# Patient Record
Sex: FEMALE | Race: WHITE | URBAN - METROPOLITAN AREA
[De-identification: names, ages, dates, MRNs, and addresses within clinical notes are randomized per-mention and may not be internally consistent; named-entity substitution may affect disease eponyms.]

---

## 2017-01-01 ENCOUNTER — OFFICE VISIT (OUTPATIENT)
Dept: UROLOGY | Facility: CLINIC | Age: 82
End: 2017-01-01
Payer: COMMERCIAL

## 2017-01-01 ENCOUNTER — OFFICE VISIT (OUTPATIENT)
Dept: UROLOGY | Facility: OTHER | Age: 82
End: 2017-01-01
Payer: COMMERCIAL

## 2017-01-01 ENCOUNTER — TELEPHONE (OUTPATIENT)
Dept: UROLOGY | Facility: CLINIC | Age: 82
End: 2017-01-01

## 2017-01-01 VITALS — RESPIRATION RATE: 10 BRPM | DIASTOLIC BLOOD PRESSURE: 73 MMHG | HEART RATE: 64 BPM | SYSTOLIC BLOOD PRESSURE: 114 MMHG

## 2017-01-01 VITALS — HEART RATE: 66 BPM | RESPIRATION RATE: 16 BRPM | SYSTOLIC BLOOD PRESSURE: 108 MMHG | DIASTOLIC BLOOD PRESSURE: 62 MMHG

## 2017-01-01 VITALS — RESPIRATION RATE: 16 BRPM | HEART RATE: 68 BPM | SYSTOLIC BLOOD PRESSURE: 110 MMHG | DIASTOLIC BLOOD PRESSURE: 58 MMHG

## 2017-01-01 VITALS — DIASTOLIC BLOOD PRESSURE: 64 MMHG | HEART RATE: 64 BPM | RESPIRATION RATE: 18 BRPM | SYSTOLIC BLOOD PRESSURE: 112 MMHG

## 2017-01-01 VITALS — SYSTOLIC BLOOD PRESSURE: 116 MMHG | HEART RATE: 64 BPM | DIASTOLIC BLOOD PRESSURE: 82 MMHG | RESPIRATION RATE: 14 BRPM

## 2017-01-01 VITALS — RESPIRATION RATE: 12 BRPM | SYSTOLIC BLOOD PRESSURE: 122 MMHG | DIASTOLIC BLOOD PRESSURE: 78 MMHG | HEART RATE: 80 BPM

## 2017-01-01 DIAGNOSIS — R33.9 INCOMPLETE BLADDER EMPTYING: ICD-10-CM

## 2017-01-01 DIAGNOSIS — R35.0 URINARY FREQUENCY: Primary | ICD-10-CM

## 2017-01-01 DIAGNOSIS — R35.0 URINARY FREQUENCY: ICD-10-CM

## 2017-01-01 DIAGNOSIS — R35.0 FREQUENCY OF URINATION: Primary | ICD-10-CM

## 2017-01-01 DIAGNOSIS — N31.2 ATONIC BLADDER: ICD-10-CM

## 2017-01-01 DIAGNOSIS — R30.0 DYSURIA: Primary | ICD-10-CM

## 2017-01-01 DIAGNOSIS — R33.9 URINARY RETENTION: ICD-10-CM

## 2017-01-01 LAB
ALBUMIN UR-MCNC: 100 MG/DL
AMORPH CRY #/AREA URNS HPF: ABNORMAL /HPF
AMORPH CRY #/AREA URNS HPF: ABNORMAL /HPF
APPEARANCE UR: ABNORMAL
APPEARANCE UR: CLEAR
APPEARANCE UR: CLEAR
BACTERIA #/AREA URNS HPF: ABNORMAL /HPF
BACTERIA SPEC CULT: ABNORMAL
BACTERIA SPEC CULT: ABNORMAL
BILIRUB UR QL STRIP: NEGATIVE
COLOR UR AUTO: YELLOW
GLUCOSE UR STRIP-MCNC: NEGATIVE MG/DL
HGB UR QL STRIP: ABNORMAL
KETONES UR STRIP-MCNC: NEGATIVE MG/DL
LEUKOCYTE ESTERASE UR QL STRIP: ABNORMAL
MICRO REPORT STATUS: ABNORMAL
MICRO REPORT STATUS: ABNORMAL
MICROORGANISM SPEC CULT: ABNORMAL
MICROORGANISM SPEC CULT: ABNORMAL
NITRATE UR QL: NEGATIVE
NITRATE UR QL: POSITIVE
NON-SQ EPI CELLS #/AREA URNS LPF: ABNORMAL /LPF
PH UR STRIP: 6 PH (ref 5–7)
PH UR STRIP: 6.5 PH (ref 5–7)
PH UR STRIP: 7 PH (ref 5–7)
RBC #/AREA URNS AUTO: ABNORMAL /HPF (ref 0–2)
SP GR UR STRIP: 1.01 (ref 1–1.03)
SP GR UR STRIP: 1.02 (ref 1–1.03)
SPECIMEN SOURCE: ABNORMAL
SPECIMEN SOURCE: ABNORMAL
URN SPEC COLLECT METH UR: ABNORMAL
UROBILINOGEN UR STRIP-ACNC: 0.2 EU/DL (ref 0.2–1)
WBC #/AREA URNS AUTO: ABNORMAL /HPF (ref 0–2)

## 2017-01-01 PROCEDURE — 51798 US URINE CAPACITY MEASURE: CPT | Performed by: UROLOGY

## 2017-01-01 PROCEDURE — 99214 OFFICE O/P EST MOD 30 MIN: CPT | Mod: 25 | Performed by: UROLOGY

## 2017-01-01 PROCEDURE — 99213 OFFICE O/P EST LOW 20 MIN: CPT | Mod: 25 | Performed by: UROLOGY

## 2017-01-01 PROCEDURE — 87186 SC STD MICRODIL/AGAR DIL: CPT | Performed by: UROLOGY

## 2017-01-01 PROCEDURE — 99024 POSTOP FOLLOW-UP VISIT: CPT | Performed by: UROLOGY

## 2017-01-01 PROCEDURE — 64561 IMPLANT NEUROELECTRODES: CPT | Mod: 50 | Performed by: UROLOGY

## 2017-01-01 PROCEDURE — 81001 URINALYSIS AUTO W/SCOPE: CPT | Performed by: UROLOGY

## 2017-01-01 PROCEDURE — 87086 URINE CULTURE/COLONY COUNT: CPT | Performed by: UROLOGY

## 2017-01-01 PROCEDURE — 87088 URINE BACTERIA CULTURE: CPT | Performed by: UROLOGY

## 2017-01-01 RX ORDER — OXYBUTYNIN CHLORIDE 5 MG/1
5 TABLET ORAL AT BEDTIME
Qty: 30 TABLET | Refills: 1 | Status: SHIPPED | OUTPATIENT
Start: 2017-01-01 | End: 2017-01-01

## 2017-01-01 RX ORDER — MIRABEGRON 25 MG/1
25 TABLET, FILM COATED, EXTENDED RELEASE ORAL DAILY
Qty: 30 TABLET | Refills: 1 | Status: SHIPPED | OUTPATIENT
Start: 2017-01-01

## 2017-01-01 RX ORDER — METHENAMINE HIPPURATE 1000 MG/1
1 TABLET ORAL 2 TIMES DAILY
Qty: 180 TABLET | Refills: 3 | Status: SHIPPED | OUTPATIENT
Start: 2017-01-01

## 2017-01-01 RX ORDER — CIPROFLOXACIN 500 MG/1
500 TABLET, FILM COATED ORAL 2 TIMES DAILY
Qty: 10 TABLET | Refills: 0 | Status: SHIPPED | OUTPATIENT
Start: 2017-01-01 | End: 2017-01-01

## 2017-01-01 RX ORDER — CEPHALEXIN 500 MG/1
500 CAPSULE ORAL 3 TIMES DAILY
Qty: 9 CAPSULE | Refills: 0 | Status: SHIPPED | OUTPATIENT
Start: 2017-01-01 | End: 2017-01-01

## 2017-01-01 ASSESSMENT — PAIN SCALES - GENERAL: PAINLEVEL: NO PAIN (0)

## 2017-01-20 NOTE — MR AVS SNAPSHOT
"              After Visit Summary   2017    Lisa Carrillo    MRN: [id number]           Patient Information     Date Of Birth          1930        Visit Information        Provider Department      2017 9:45 AM Hernán Mijares MD [facility]        Today's Diagnoses     Dysuria    -  1     Incomplete bladder emptying            Follow-ups after your visit        Who to contact     If you have questions or need follow up information about today's clinic visit or your schedule please contact [facility] directly at [phone].  Normal or non-critical lab and imaging results will be communicated to you by [employer], letter or phone within 4 business days after the clinic has received the results. If you do not hear from us within 7 days, please contact the clinic through [employer] or phone. If you have a critical or abnormal lab result, we will notify you by phone as soon as possible.  Submit refill requests through [employer] or call your pharmacy and they will forward the refill request to us. Please allow 3 business days for your refill to be completed.          Additional Information About Your Visit        MyChart Information     [employer] lets you send messages to your doctor, view your test results, renew your prescriptions, schedule appointments and more. To sign up, go to [email or web address] . Click on \"Log in\" on the left side of the screen, which will take you to the Welcome page. Then click on \"Sign up Now\" on the right side of the page.     You will be asked to enter the access code listed below, as well as some personal information. Please follow the directions to create your username and password.     Your access code is: [email or web address]  Expires: 2017 10:43 AM     Your access code will  in 90 days. If you need help or a new code, please call your [facility] or [phone].        Care EveryWhere ID     This is your Care EveryWhere ID. This could be " used by other organizations to access your [place] medical records  [id number]        Your Vitals Were     Pulse Respirations Last Period             80 12 (LMP Unknown)          Blood Pressure from Last 3 Encounters:   01/20/17 122/78   11/03/16 110/76   10/26/16 128/72    Weight from Last 3 Encounters:   No data found for Wt              We Performed the Following     Residual Urine By Bladder Scan     UA reflex to Microscopic and Culture     Urine Culture Aerobic Bacterial     Urine Microscopic          Today's Medication Changes          These changes are accurate as of: 1/20/17 10:43 AM.  If you have any questions, ask your nurse or doctor.               Start taking these medicines.        Dose/Directions    ciprofloxacin 500 MG tablet   Commonly known as:  CIPRO   Used for:  Dysuria   Started by:  Hernán Mijares MD        Dose:  500 mg   Take 1 tablet (500 mg) by mouth 2 times daily for 5 days   Quantity:  10 tablet   Refills:  0            Where to get your medicines      These medications were sent to [id number] - [place], [place] - [address]  [address] [address]     Phone:  [phone]    - ciprofloxacin 500 MG tablet             Primary Care Provider Office Phone #    [facility] [phone]       No address on file        Thank you!     Thank you for choosing [facility]  for your care. Our goal is always to provide you with excellent care. Hearing back from our patients is one way we can continue to improve our services. Please take a few minutes to complete the written survey that you may receive in the mail after your visit with us. Thank you!             Your Updated Medication List - Protect others around you: Learn how to safely use, store and throw away your medicines at www.disposemymeds.org.          This list is accurate as of: 1/20/17 10:43 AM.  Always use your most recent med list.                   Brand Name  Dispense Instructions for use    ascorbic acid 250 MG Chew chewable tablet    vitamin C     Take 500 mg by mouth daily       ATORVASTATIN CALCIUM PO      Patient does not know dose that she is taking       ciprofloxacin 500 MG tablet    CIPRO    10 tablet    Take 1 tablet (500 mg) by mouth 2 times daily for 5 days       CLOPIDOGREL BISULFATE PO      Patient does not know current dose that she is taking       methenamine hippurate 1 G Tabs tablet    HIPREX     Take 1 g by mouth 2 times daily       Multi-vitamin Tabs tablet      Take 1 tablet by mouth daily       traMADol 50 MG tablet    ULTRAM    20 tablet    Take 1-2 tablets ( mg) by mouth every 6 hours as needed for moderate pain

## 2017-01-20 NOTE — PROGRESS NOTES
SUBJECTIVE:  Jose Alfredo Oconnor is a pleasant 86-year-old  female who presents to clinic today for a consultation with regard to the patient's urinary frequency, dysuria with history of incomplete bladder emptying.  The patient was seen by Dr. West last year and was found to be in partial urinary retention.  She also was found to have cystitis and had a biopsy done by Dr. West that showed chronic cystitis.  She has been on methenamine for prevention of UTI.  She does perform self-intermittent catheterization about once at night; however, lately she said that she hardly had any urine output when she catheterizes herself.  She complains of some frequency, urgency and some incontinence.  She has no fever, nausea or vomiting.      Bladder scan today showed residual urine of 102 mL.  UA shows positive nitrite and leukocyte esterase along with 10-25 red blood cell per high-power field and 5-10 WBC per high-power field.      ASSESSMENT:   1.  History of incomplete bladder emptying.  Bladder scan today showed only 100 mL of water and she reports little urine output when she catheterizes herself.  I recommend that she stop performing intermittent catheterization.   2.  Dysuria with frequency, probably secondary to cystitis.      RECOMMENDATION:  Start patient on Cipro 500 mg p.o. b.i.d. for 5 days for urine culture.  If her symptoms have not improved in a couple of weeks, then it may be reasonable to go ahead and start patient on anticholinergic to help with overactive bladder symptoms.         JOSE D IYER MD             D: 2017 10:43   T: 2017 10:58   MT: LEONOR      Name:     JOSE ALFREDO OCONNOR   MRN:      [id number]        Account:      [id number]   :      1930           Visit Date:   2017      Document: [id number]

## 2017-01-20 NOTE — PROGRESS NOTES
S: See dictated note   Current Outpatient Prescriptions   Medication Sig Dispense Refill     ciprofloxacin (CIPRO) 500 MG tablet Take 1 tablet (500 mg) by mouth 2 times daily for 5 days 10 tablet 0     traMADol (ULTRAM) 50 MG tablet Take 1-2 tablets ( mg) by mouth every 6 hours as needed for moderate pain 20 tablet 0     methenamine hippurate (HIPREX) 1 G TABS Take 1 g by mouth 2 times daily       CLOPIDOGREL BISULFATE PO Patient does not know current dose that she is taking       ATORVASTATIN CALCIUM PO Patient does not know dose that she is taking       ascorbic acid (VITAMIN C) 250 MG CHEW Take 500 mg by mouth daily       multivitamin, therapeutic with minerals (MULTI-VITAMIN) TABS Take 1 tablet by mouth daily       Allergies   Allergen Reactions     Sulfa Drugs Unknown     Does not remember reaction type       No past medical history on file.  No past surgical history on file.   No family history on file.  Social History     Social History     Marital Status:      Spouse Name: N/A     Number of Children: N/A     Years of Education: N/A     Social History Main Topics     Smoking status: Never Smoker      Smokeless tobacco: None     Alcohol Use: None     Drug Use: None     Sexual Activity: Not Asked     Other Topics Concern     None     Social History Narrative       REVIEW OF SYSTEMS  =================  C: NEGATIVE for fever, chills, change in weight  I: NEGATIVE for worrisome rashes, moles or lesions  E/M: NEGATIVE for ear, mouth and throat problems  R: NEGATIVE for significant cough or SHORTNESS OF BREATH  CV:  NEGATIVE for chest pain, palpitations or peripheral edema  GI: NEGATIVE for nausea, abdominal pain, heartburn, or change in bowel habits  NEURO: NEGATIVE numbness/weakness  : see HPI  PSYCH: NEGATIVE depression/anxiety  LYmph: no new enlarged lymph nodes  Ortho: no new trauma/movements      Physical Exam:  /78 mmHg  Pulse 80  Resp 12  LMP  (LMP Unknown)   Patient is pleasant, in  no acute distress, good general condition.  HEENT:  Normalcephalic, atraumatic  Lung: no evidence of respiratory distress    Abdomen: Soft, nondistended, non tender. No masses. No rebound or guarding.   Exam: no suprapubic tenderness.  Bladder scan 102 ml.  Skin: Warm and dry.  No redness.  Neuro: grossly normal  Psych normal mood and affect  Musculoskeletal  moving all extremities    Assessment/Plan:   See dictated note

## 2017-01-20 NOTE — NURSING NOTE
Chief Complaint   Patient presents with     Consult       Initial /78 mmHg  Pulse 80  Resp 12  LMP  (LMP Unknown) There is no height or weight on file to calculate BMI.  BP completed using cuff size: jorge a Brennan RN

## 2017-02-21 NOTE — PROGRESS NOTES
Chief Complaint   Patient presents with     RECHECK       Lisa Carrillo is a 86 year old female who presents today for follow up of   Chief Complaint   Patient presents with     RECHECK    f/u for urinary issues.  She complaints mainly of nocturia.  She gets up 4-5 x at night.  During the daytime, it is not bad.  She has h/o incomplete bladder emptying and was doing SIC for a short time.  She denies any UTI symptoms    Current Outpatient Prescriptions   Medication Sig Dispense Refill     oxybutynin (DITROPAN) 5 MG tablet Take 1 tablet (5 mg) by mouth At Bedtime 30 tablet 1     traMADol (ULTRAM) 50 MG tablet Take 1-2 tablets ( mg) by mouth every 6 hours as needed for moderate pain 20 tablet 0     methenamine hippurate (HIPREX) 1 G TABS Take 1 g by mouth 2 times daily Reported on 2/21/2017       CLOPIDOGREL BISULFATE PO Patient does not know current dose that she is taking       ATORVASTATIN CALCIUM PO Patient does not know dose that she is taking       ascorbic acid (VITAMIN C) 250 MG CHEW Take 500 mg by mouth daily       multivitamin, therapeutic with minerals (MULTI-VITAMIN) TABS Take 1 tablet by mouth daily       Allergies   Allergen Reactions     Sulfa Drugs Unknown     Does not remember reaction type        No past medical history on file.  No past surgical history on file.  No family history on file.  Social History     Social History     Marital status:      Spouse name: N/A     Number of children: N/A     Years of education: N/A     Social History Main Topics     Smoking status: Never Smoker     Smokeless tobacco: None     Alcohol use None     Drug use: None     Sexual activity: Not Asked     Other Topics Concern     None     Social History Narrative       REVIEW OF SYSTEMS  =================  C: NEGATIVE for fever, chills, change in weight  I: NEGATIVE for worrisome rashes, moles or lesions  E/M: NEGATIVE for ear, mouth and throat problems  R: NEGATIVE for significant cough or SHORTNESS OF  BREATH,   CV: NEGATIVE for chest pain, palpitations or peripheral edema  GI: NEGATIVE for nausea, abdominal pain, heartburn, or change in bowel habits  NEURO: NEGATIVE any motor/sensory changes  PSYCH: NEGATIVE for recent mood disorder    Physical Exam:  /82 (BP Location: Right arm, Cuff Size: Adult Regular)  Pulse 64  Resp 14  LMP  (LMP Unknown)   Patient is pleasant, in no acute distress, good general condition.  Lung: no evidence of respiratory distress    Abdomen: Soft, nondistended, non tender. No masses. No rebound or guarding.   Exam: bladder scan 156 ml.  No cva tenderness  Skin: Warm and dry.  No redness.  Psych: normal mood and affect  Neuro: alert and oriented    Assessment/Plan:   (R35.0) Frequency of urination  (primary encounter diagnosis)  Comment:    Plan: trial of ditropan           Side effects discussed            See in one month           Briefly discussed interstim    (R33.9) Incomplete bladder emptying  Comment: bladder scan 156 ml, stable  Plan: prn.  Would not recommend SIC given low RU.

## 2017-02-21 NOTE — MR AVS SNAPSHOT
"              After Visit Summary   2017    Lisa Carrillo    MRN: [id number]           Patient Information     Date Of Birth          1930        Visit Information        Provider Department      2017 2:15 PM Hernán Mijares MD [facility]        Today's Diagnoses     Frequency of urination    -  1    Incomplete bladder emptying           Follow-ups after your visit        Who to contact     If you have questions or need follow up information about today's clinic visit or your schedule please contact [facility] directly at [phone].  Normal or non-critical lab and imaging results will be communicated to you by [employer], letter or phone within 4 business days after the clinic has received the results. If you do not hear from us within 7 days, please contact the clinic through [employer] or phone. If you have a critical or abnormal lab result, we will notify you by phone as soon as possible.  Submit refill requests through [employer] or call your pharmacy and they will forward the refill request to us. Please allow 3 business days for your refill to be completed.          Additional Information About Your Visit        MyChart Information     [employer] lets you send messages to your doctor, view your test results, renew your prescriptions, schedule appointments and more. To sign up, go to [email or web address] . Click on \"Log in\" on the left side of the screen, which will take you to the Welcome page. Then click on \"Sign up Now\" on the right side of the page.     You will be asked to enter the access code listed below, as well as some personal information. Please follow the directions to create your username and password.     Your access code is: [email or web address]  Expires: 2017 10:43 AM     Your access code will  in 90 days. If you need help or a new code, please call your [facility] or [phone].        Care EveryWhere ID     This is your Care EveryWhere ID. " This could be used by other organizations to access your [place] medical records  [id number]        Your Vitals Were     Pulse Respirations Last Period             64 14 (LMP Unknown)          Blood Pressure from Last 3 Encounters:   02/21/17 116/82   01/20/17 122/78   11/03/16 110/76    Weight from Last 3 Encounters:   No data found for Wt              We Performed the Following     MEASURE POST-VOID RESIDUAL URINE/BLADDER CAPACITY, US NON-IMAGING     UA reflex to Microscopic and Culture     Urine Microscopic          Today's Medication Changes          These changes are accurate as of: 2/21/17  2:37 PM.  If you have any questions, ask your nurse or doctor.               Start taking these medicines.        Dose/Directions    oxybutynin 5 MG tablet   Commonly known as:  DITROPAN   Used for:  Frequency of urination   Started by:  Hernán Mijares MD        Dose:  5 mg   Take 1 tablet (5 mg) by mouth At Bedtime   Quantity:  30 tablet   Refills:  1            Where to get your medicines      These medications were sent to [id number] - [place], [place] - [address]  [address] [address]     Phone:  [phone]     oxybutynin 5 MG tablet                Primary Care Provider Office Phone #    [facility] [phone]       No address on file        Thank you!     Thank you for choosing [facility]  for your care. Our goal is always to provide you with excellent care. Hearing back from our patients is one way we can continue to improve our services. Please take a few minutes to complete the written survey that you may receive in the mail after your visit with us. Thank you!             Your Updated Medication List - Protect others around you: Learn how to safely use, store and throw away your medicines at www.disposemymeds.org.          This list is accurate as of: 2/21/17  2:37 PM.  Always use your most recent med list.                   Brand Name  Dispense Instructions for use    ascorbic acid 250 MG Chew chewable tablet    vitamin C     Take 500 mg by mouth daily       ATORVASTATIN CALCIUM PO      Patient does not know dose that she is taking       CLOPIDOGREL BISULFATE PO      Patient does not know current dose that she is taking       methenamine hippurate 1 G Tabs tablet    HIPREX     Take 1 g by mouth 2 times daily Reported on 2/21/2017       Multi-vitamin Tabs tablet      Take 1 tablet by mouth daily       oxybutynin 5 MG tablet    DITROPAN    30 tablet    Take 1 tablet (5 mg) by mouth At Bedtime       traMADol 50 MG tablet    ULTRAM    20 tablet    Take 1-2 tablets ( mg) by mouth every 6 hours as needed for moderate pain

## 2017-02-21 NOTE — NURSING NOTE
Chief Complaint   Patient presents with     RECHECK       Initial /82 (BP Location: Right arm, Cuff Size: Adult Regular)  Pulse 64  Resp 14  LMP  (LMP Unknown) There is no height or weight on file to calculate BMI.  Medication Reconciliation: complete   Francine Rucker, CMA

## 2017-04-25 NOTE — PROGRESS NOTES
Bladder Scan performed. 447 ml maximum residual urine detected after 3 scans. MD informed.    Freya Do, CMA

## 2017-04-25 NOTE — MR AVS SNAPSHOT
"              After Visit Summary   2017    Lisa Carrillo    MRN: [id number]           Patient Information     Date Of Birth          1930        Visit Information        Provider Department      2017 1:45 PM Hernán Mijares MD [facility]        Today's Diagnoses     Urinary frequency    -  1    Incomplete bladder emptying           Follow-ups after your visit        Who to contact     If you have questions or need follow up information about today's clinic visit or your schedule please contact [facility] directly at [phone].  Normal or non-critical lab and imaging results will be communicated to you by [employer], letter or phone within 4 business days after the clinic has received the results. If you do not hear from us within 7 days, please contact the clinic through [employer] or phone. If you have a critical or abnormal lab result, we will notify you by phone as soon as possible.  Submit refill requests through [employer] or call your pharmacy and they will forward the refill request to us. Please allow 3 business days for your refill to be completed.          Additional Information About Your Visit        MyChart Information     [employer] lets you send messages to your doctor, view your test results, renew your prescriptions, schedule appointments and more. To sign up, go to [email or web address] . Click on \"Log in\" on the left side of the screen, which will take you to the Welcome page. Then click on \"Sign up Now\" on the right side of the page.     You will be asked to enter the access code listed below, as well as some personal information. Please follow the directions to create your username and password.     Your access code is: [email or web address]  Expires: 2017  2:24 PM     Your access code will  in 90 days. If you need help or a new code, please call your [facility] or [phone].        Care EveryWhere ID     This is your Care EveryWhere ID. This " could be used by other organizations to access your [place] medical records  [id number]        Your Vitals Were     Pulse Respirations Last Period             68 16 (LMP Unknown)          Blood Pressure from Last 3 Encounters:   04/25/17 110/58   02/21/17 116/82   01/20/17 122/78    Weight from Last 3 Encounters:   No data found for Wt              We Performed the Following     MEASURE POST-VOID RESIDUAL URINE/BLADDER CAPACITY, US NON-IMAGING     UA reflex to Microscopic and Culture     Urine Microscopic        Primary Care Provider Office Phone #    [facility] [phone]       No address on file        Thank you!     Thank you for choosing [facility]  for your care. Our goal is always to provide you with excellent care. Hearing back from our patients is one way we can continue to improve our services. Please take a few minutes to complete the written survey that you may receive in the mail after your visit with us. Thank you!             Your Updated Medication List - Protect others around you: Learn how to safely use, store and throw away your medicines at www.disposemymeds.org.          This list is accurate as of: 4/25/17  4:30 PM.  Always use your most recent med list.                   Brand Name Dispense Instructions for use    ascorbic acid 250 MG Chew chewable tablet    vitamin C     Take 500 mg by mouth daily       ATORVASTATIN CALCIUM PO      Patient does not know dose that she is taking       CLOPIDOGREL BISULFATE PO      Patient does not know current dose that she is taking       methenamine hippurate 1 G Tabs tablet    HIPREX     Take 1 g by mouth 2 times daily Reported on 2/21/2017       Multi-vitamin Tabs tablet      Take 1 tablet by mouth daily       oxybutynin 5 MG tablet    DITROPAN    30 tablet    Take 1 tablet (5 mg) by mouth At Bedtime       traMADol 50 MG tablet    ULTRAM    20 tablet    Take 1-2 tablets ( mg) by mouth every 6 hours as needed for moderate  pain

## 2017-04-25 NOTE — NURSING NOTE
Chief Complaint   Patient presents with     RECHECK     Frequency. Patient reports frequency has worsened since last appointment       Initial /58 (BP Location: Right arm, Patient Position: Chair, Cuff Size: Adult Regular)  Pulse 68  Resp 16  LMP  (LMP Unknown) There is no height or weight on file to calculate BMI.  Medication Reconciliation: complete     Freya Do CMA'

## 2017-04-25 NOTE — PROGRESS NOTES
Chief Complaint   Patient presents with     RECHECK     Frequency. Patient reports frequency has worsened since last appointment       Lisa Carrillo is a 86 year old female who presents today for follow up of   Chief Complaint   Patient presents with     RECHECK     Frequency. Patient reports frequency has worsened since last appointment    f/u for urinary frequency.  She has more symptoms lately.  She has no f/v/n.  She has tried ditropan without any improvements.    Current Outpatient Prescriptions   Medication Sig Dispense Refill     oxybutynin (DITROPAN) 5 MG tablet Take 1 tablet (5 mg) by mouth At Bedtime 30 tablet 1     traMADol (ULTRAM) 50 MG tablet Take 1-2 tablets ( mg) by mouth every 6 hours as needed for moderate pain 20 tablet 0     methenamine hippurate (HIPREX) 1 G TABS Take 1 g by mouth 2 times daily Reported on 2/21/2017       CLOPIDOGREL BISULFATE PO Patient does not know current dose that she is taking       ATORVASTATIN CALCIUM PO Patient does not know dose that she is taking       ascorbic acid (VITAMIN C) 250 MG CHEW Take 500 mg by mouth daily       multivitamin, therapeutic with minerals (MULTI-VITAMIN) TABS Take 1 tablet by mouth daily       Allergies   Allergen Reactions     Sulfa Drugs Unknown     Does not remember reaction type        No past medical history on file.  No past surgical history on file.  No family history on file.  Social History     Social History     Marital status:      Spouse name: N/A     Number of children: N/A     Years of education: N/A     Social History Main Topics     Smoking status: Never Smoker     Smokeless tobacco: None     Alcohol use None     Drug use: None     Sexual activity: Not Asked     Other Topics Concern     None     Social History Narrative       REVIEW OF SYSTEMS  =================  C: NEGATIVE for fever, chills, change in weight  I: NEGATIVE for worrisome rashes, moles or lesions  E/M: NEGATIVE for ear, mouth and throat problems  R:  NEGATIVE for significant cough or SHORTNESS OF BREATH,   CV: NEGATIVE for chest pain, palpitations or peripheral edema  GI: NEGATIVE for nausea, abdominal pain, heartburn, or change in bowel habits  NEURO: NEGATIVE any motor/sensory changes  PSYCH: NEGATIVE for recent mood disorder    Physical Exam:  /58 (BP Location: Right arm, Patient Position: Chair, Cuff Size: Adult Regular)  Pulse 68  Resp 16  LMP  (LMP Unknown)   Patient is pleasant, in no acute distress, good general condition.  Lung: no evidence of respiratory distress    Abdomen: Soft, nondistended, non tender. No masses. No rebound or guarding.   Exam: bladder scan 447 ml.  No cva tenderness  Skin: Warm and dry.  No redness.  Psych: normal mood and affect  Neuro: alert and oriented    Assessment/Plan:   (R35.0) Urinary frequency  (primary encounter diagnosis)  Comment:      (R33.9) Incomplete bladder emptying  Comment: discussed interstim as an option  Plan: info on tx given          Will schedule for testings.

## 2017-05-10 NOTE — PROGRESS NOTES
Patient returns for office interstim testings.  Patient is placed prone.  She was draped and prepped in sterile fashion.  Anatomical landmarks are outlined.  Local anesthetics injected (1% lidocaine.)  Needle is placed in S3 foramen on both sides and exchanged with temporary leads.  Patient has good  sensory functions.   She tolerated procedure well.

## 2017-05-10 NOTE — NURSING NOTE
Chief Complaint   Patient presents with     RECHECK     Interstim procedure       Initial /64 (BP Location: Right arm, Patient Position: Chair, Cuff Size: Adult Large)  Pulse 64  Resp 18  LMP  (LMP Unknown) There is no height or weight on file to calculate BMI.  Medication Reconciliation: complete     Malathi Ngo CMA (AAMA)

## 2017-05-10 NOTE — MR AVS SNAPSHOT
"              After Visit Summary   5/10/2017    Lisa Carrillo    MRN: [id number]           Patient Information     Date Of Birth          12/8/1930        Visit Information        Provider Department      5/10/2017 11:00 AM Hernán Mijares MD [facility]        Today's Diagnoses     Urinary frequency    -  1    Incomplete bladder emptying           Follow-ups after your visit        Your next 10 appointments already scheduled     May 19, 2017  9:30 AM CDT   Return Visit with Hernán Mijares MD   [facility] ([address]  [place] [id number] [phone]              Who to contact     If you have questions or need follow up information about today's clinic visit or your schedule please contact [facility] directly at [phone].  Normal or non-critical lab and imaging results will be communicated to you by MyChart, letter or phone within 4 business days after the clinic has received the results. If you do not hear from us within 7 days, please contact the clinic through MyChart or phone. If you have a critical or abnormal lab result, we will notify you by phone as soon as possible.  Submit refill requests through TouchOfModern or call your pharmacy and they will forward the refill request to us. Please allow 3 business days for your refill to be completed.          Additional Information About Your Visit        MyChart Information     TouchOfModern lets you send messages to your doctor, view your test results, renew your prescriptions, schedule appointments and more. To sign up, go to [email or web address] . Click on \"Log in\" on the left side of the screen, which will take you to the Welcome page. Then click on \"Sign up Now\" on the right side of the page.     You will be asked to enter the access code listed below, as well as some personal information. Please follow the directions to create your username and password.     Your " access code is: [email or web address]  Expires: 2017  2:24 PM     Your access code will  in 90 days. If you need help or a new code, please call your [facility] or [phone].        Care EveryWhere ID     This is your Care EveryWhere ID. This could be used by other organizations to access your [place] medical records  [id number]        Your Vitals Were     Pulse Respirations Last Period             64 18 (LMP Unknown)          Blood Pressure from Last 3 Encounters:   05/10/17 112/64   17 110/58   17 116/82    Weight from Last 3 Encounters:   No data found for Wt              We Performed the Following     PERCUT IMPLANT,NEUROELEC,SACRAL NERVE     PERCUT IMPLANT,NEUROELEC,SACRAL NERVE        Primary Care Provider Office Phone #    [facility] [phone]       No address on file        Thank you!     Thank you for choosing [facility]  for your care. Our goal is always to provide you with excellent care. Hearing back from our patients is one way we can continue to improve our services. Please take a few minutes to complete the written survey that you may receive in the mail after your visit with us. Thank you!             Your Updated Medication List - Protect others around you: Learn how to safely use, store and throw away your medicines at www.disposemymeds.org.          This list is accurate as of: 5/10/17 12:13 PM.  Always use your most recent med list.                   Brand Name Dispense Instructions for use    ascorbic acid 250 MG Chew chewable tablet    vitamin C     Take 500 mg by mouth daily       ATORVASTATIN CALCIUM PO      Patient does not know dose that she is taking       CLOPIDOGREL BISULFATE PO      Patient does not know current dose that she is taking       methenamine hippurate 1 G Tabs tablet    HIPREX     Take 1 g by mouth 2 times daily Reported on 2017       Multi-vitamin Tabs tablet      Take 1 tablet by mouth daily       oxybutynin 5 MG  tablet    DITROPAN    30 tablet    Take 1 tablet (5 mg) by mouth At Bedtime       traMADol 50 MG tablet    ULTRAM    20 tablet    Take 1-2 tablets ( mg) by mouth every 6 hours as needed for moderate pain

## 2017-05-19 NOTE — PROGRESS NOTES
Chief Complaint   Patient presents with     RECHECK     Interstim       Lisa Carrillo is a 86 year old female who presents today for follow up of   Chief Complaint   Patient presents with     RECHECK     Interstim    f/u after interstim testing.  She does not think it is helping her.    Current Outpatient Prescriptions   Medication Sig Dispense Refill     methenamine hippurate (HIPREX) 1 G TABS tablet Take 1 tablet (1 g) by mouth 2 times daily Reported on 2/21/2017 180 tablet 3     traMADol (ULTRAM) 50 MG tablet Take 1-2 tablets ( mg) by mouth every 6 hours as needed for moderate pain 20 tablet 0     CLOPIDOGREL BISULFATE PO Patient does not know current dose that she is taking       ATORVASTATIN CALCIUM PO Patient does not know dose that she is taking       ascorbic acid (VITAMIN C) 250 MG CHEW Take 500 mg by mouth daily       multivitamin, therapeutic with minerals (MULTI-VITAMIN) TABS Take 1 tablet by mouth daily       oxybutynin (DITROPAN) 5 MG tablet Take 1 tablet (5 mg) by mouth At Bedtime (Patient not taking: Reported on 5/19/2017) 30 tablet 1     Allergies   Allergen Reactions     Sulfa Drugs Unknown     Does not remember reaction type        No past medical history on file.  No past surgical history on file.  No family history on file.  Social History     Social History     Marital status:      Spouse name: N/A     Number of children: N/A     Years of education: N/A     Social History Main Topics     Smoking status: Never Smoker     Smokeless tobacco: None     Alcohol use None     Drug use: None     Sexual activity: Not Asked     Other Topics Concern     None     Social History Narrative       REVIEW OF SYSTEMS  =================  C: NEGATIVE for fever, chills, change in weight  I: NEGATIVE for worrisome rashes, moles or lesions  E/M: NEGATIVE for ear, mouth and throat problems  R: NEGATIVE for significant cough or SHORTNESS OF BREATH,   CV: NEGATIVE for chest pain, palpitations or peripheral  edema  GI: NEGATIVE for nausea, abdominal pain, heartburn, or change in bowel habits  NEURO: NEGATIVE any motor/sensory changes  PSYCH: NEGATIVE for recent mood disorder    Physical Exam:  /62 (BP Location: Right arm, Patient Position: Chair, Cuff Size: Adult Regular)  Pulse 66  Resp 16  LMP  (LMP Unknown)   Patient is pleasant, in no acute distress, good general condition.  Lung: no evidence of respiratory distress    Abdomen: Soft, nondistended, non tender. No masses. No rebound or guarding.   Exam: wires removed today  Skin: Warm and dry.  No redness.  Psych: normal mood and affect  Neuro: alert and oriented    Assessment/Plan:   (R35.0) Urinary frequency  (primary encounter diagnosis)  Comment:    Plan:  I don't have anything else to offer her.  botox would not be an option given h/o urinary retention.  (R33.9) Incomplete bladder emptying  Comment:    Plan: bladder can 90 ml          See in 2 months for re-exam.

## 2017-05-19 NOTE — NURSING NOTE
Chief Complaint   Patient presents with     RECHECK     Interstim       Initial /62 (BP Location: Right arm, Patient Position: Chair, Cuff Size: Adult Regular)  Pulse 66  Resp 16  LMP  (LMP Unknown) There is no height or weight on file to calculate BMI.  Medication Reconciliation: complete    Freya Do, CMA

## 2017-05-19 NOTE — PROGRESS NOTES
Bladder Scan performed. 93ml maximum residual urine detected after 3 scans. MD informed.    Freya Do, CMA

## 2017-05-19 NOTE — MR AVS SNAPSHOT
"              After Visit Summary   2017    Lisa Carrillo    MRN: [id number]           Patient Information     Date Of Birth          1930        Visit Information        Provider Department      2017 9:30 AM Hernán Mijares MD [facility]        Today's Diagnoses     Urinary frequency    -  1    Incomplete bladder emptying           Follow-ups after your visit        Who to contact     If you have questions or need follow up information about today's clinic visit or your schedule please contact [facility] directly at [phone].  Normal or non-critical lab and imaging results will be communicated to you by Socogamehart, letter or phone within 4 business days after the clinic has received the results. If you do not hear from us within 7 days, please contact the clinic through Socogamehart or phone. If you have a critical or abnormal lab result, we will notify you by phone as soon as possible.  Submit refill requests through Silent Edge or call your pharmacy and they will forward the refill request to us. Please allow 3 business days for your refill to be completed.          Additional Information About Your Visit        MyChart Information     Silent Edge lets you send messages to your doctor, view your test results, renew your prescriptions, schedule appointments and more. To sign up, go to [email or web address] . Click on \"Log in\" on the left side of the screen, which will take you to the Welcome page. Then click on \"Sign up Now\" on the right side of the page.     You will be asked to enter the access code listed below, as well as some personal information. Please follow the directions to create your username and password.     Your access code is: [email or web address]  Expires: 2017  2:24 PM     Your access code will  in 90 days. If you need help or a new code, please call your [facility] or [phone].        Care EveryWhere ID     This is your Care EveryWhere ID. This " could be used by other organizations to access your [place] medical records  [id number]        Your Vitals Were     Pulse Respirations Last Period             66 16 (LMP Unknown)          Blood Pressure from Last 3 Encounters:   05/19/17 108/62   05/10/17 112/64   04/25/17 110/58    Weight from Last 3 Encounters:   No data found for Wt              We Performed the Following     MEASURE POST-VOID RESIDUAL URINE/BLADDER CAPACITY, US NON-IMAGING     UA reflex to Microscopic and Culture     Urine Microscopic        [facility] Phone [facility] [phone]       No address on file        Thank you!     Thank you for choosing [facility]  for your care. Our goal is always to provide you with excellent care. Hearing back from our patients is one way we can continue to improve our services. Please take a few minutes to complete the written survey that you may receive in the mail after your visit with us. Thank you!             Your Updated Medication List - Protect others around you: Learn how to safely use, store and throw away your medicines at www.disposemymeds.org.          This list is accurate as of: 5/19/17 10:08 AM.  Always use your most recent med list.                   Brand Name Dispense Instructions for use    ascorbic acid 250 MG Chew chewable tablet    vitamin C     Take 500 mg by mouth daily       ATORVASTATIN CALCIUM PO      Patient does not know dose that she is taking       CLOPIDOGREL BISULFATE PO      Patient does not know current dose that she is taking       methenamine hippurate 1 G Tabs tablet    HIPREX    180 tablet    Take 1 tablet (1 g) by mouth 2 times daily Reported on 2/21/2017       Multi-vitamin Tabs tablet      Take 1 tablet by mouth daily       oxybutynin 5 MG tablet    DITROPAN    30 tablet    Take 1 tablet (5 mg) by mouth At Bedtime       traMADol 50 MG tablet    ULTRAM    20 tablet    Take 1-2 tablets ( mg) by mouth every 6  hours as needed for moderate pain

## 2017-06-19 NOTE — PROGRESS NOTES
Bladder Scan performed. 94 maximum residual urine detected after 3 scans. MD kenneth Brennan RN

## 2017-06-19 NOTE — NURSING NOTE
Chief Complaint   Patient presents with     RECHECK       Initial /73 (BP Location: Right arm, Patient Position: Chair, Cuff Size: Adult Regular)  Pulse 64  Resp 10  LMP  (LMP Unknown) There is no height or weight on file to calculate BMI.  Medication Reconciliation: complete   Adriane Brennan RN

## 2017-06-19 NOTE — PROGRESS NOTES
Chief Complaint   Patient presents with     RECHECK       Lisa Carrillo is a 86 year old female who presents today for follow up of   Chief Complaint   Patient presents with     RECHECK    f/u for urinary frequency.  She has failed all therapies so far.  She continues to have frequency/nocturia.  She is not a good candidate for botox given incomplete bladder emptying.    Current Outpatient Prescriptions   Medication Sig Dispense Refill     mirabegron (MYRBETRIQ) 25 MG 24 hr tablet Take 1 tablet (25 mg) by mouth daily 30 tablet 1     methenamine hippurate (HIPREX) 1 G TABS tablet Take 1 tablet (1 g) by mouth 2 times daily Reported on 2/21/2017 180 tablet 3     traMADol (ULTRAM) 50 MG tablet Take 1-2 tablets ( mg) by mouth every 6 hours as needed for moderate pain 20 tablet 0     CLOPIDOGREL BISULFATE PO Patient does not know current dose that she is taking       ATORVASTATIN CALCIUM PO Patient does not know dose that she is taking       ascorbic acid (VITAMIN C) 250 MG CHEW Take 500 mg by mouth daily       multivitamin, therapeutic with minerals (MULTI-VITAMIN) TABS Take 1 tablet by mouth daily       Allergies   Allergen Reactions     Sulfa Drugs Unknown     Does not remember reaction type        No past medical history on file.  No past surgical history on file.  No family history on file.  Social History     Social History     Marital status:      Spouse name: N/A     Number of children: N/A     Years of education: N/A     Social History Main Topics     Smoking status: Never Smoker     Smokeless tobacco: None     Alcohol use None     Drug use: None     Sexual activity: Not Asked     Other Topics Concern     None     Social History Narrative       REVIEW OF SYSTEMS  =================  C: NEGATIVE for fever, chills, change in weight  I: NEGATIVE for worrisome rashes, moles or lesions  E/M: NEGATIVE for ear, mouth and throat problems  R: NEGATIVE for significant cough or SHORTNESS OF BREATH,   CV:  NEGATIVE for chest pain, palpitations or peripheral edema  GI: NEGATIVE for nausea, abdominal pain, heartburn, or change in bowel habits  NEURO: NEGATIVE any motor/sensory changes  PSYCH: NEGATIVE for recent mood disorder    Physical Exam:  /73 (BP Location: Right arm, Patient Position: Chair, Cuff Size: Adult Regular)  Pulse 64  Resp 10  LMP  (LMP Unknown)   Patient is pleasant, in no acute distress, good general condition.  Lung: no evidence of respiratory distress    Abdomen: Soft, nondistended, non tender. No masses. No rebound or guarding.   Exam: no cva tenderness.  Bladder scan 90 ml.  Skin: Warm and dry.  No redness.  Psych: normal mood and affect  Neuro: alert and oriented    Assessment/Plan:   (R35.0) Urinary frequency  (primary encounter diagnosis)  Comment:    Plan: trial of mebetrig          UA/UC.             I don't have anything else to offer her.

## 2017-06-19 NOTE — MR AVS SNAPSHOT
"              After Visit Summary   2017    Lisa Carrillo    MRN: [id number]           Patient Information     Date Of Birth          1930        Visit Information        Provider Department      2017 9:45 AM Hernán Mijares MD [facility]        Today's Diagnoses     Urinary frequency    -  1       Follow-ups after your visit        Who to contact     If you have questions or need follow up information about today's clinic visit or your schedule please contact [facility] directly at [phone].  Normal or non-critical lab and imaging results will be communicated to you by Survaturehart, letter or phone within 4 business days after the clinic has received the results. If you do not hear from us within 7 days, please contact the clinic through Survaturehart or phone. If you have a critical or abnormal lab result, we will notify you by phone as soon as possible.  Submit refill requests through Arcaris or call your pharmacy and they will forward the refill request to us. Please allow 3 business days for your refill to be completed.          Additional Information About Your Visit        MyChart Information     Arcaris lets you send messages to your doctor, view your test results, renew your prescriptions, schedule appointments and more. To sign up, go to [email or web address] . Click on \"Log in\" on the left side of the screen, which will take you to the Welcome page. Then click on \"Sign up Now\" on the right side of the page.     You will be asked to enter the access code listed below, as well as some personal information. Please follow the directions to create your username and password.     Your access code is: [email or web address]  Expires: 2017  2:24 PM     Your access code will  in 90 days. If you need help or a new code, please call your [facility] or [phone].        Care EveryWhere ID     This is your Care EveryWhere ID. This could be used by other organizations " to access your [place] medical records  [id number]        Your Vitals Were     Pulse Respirations Last Period             64 10 (LMP Unknown)          Blood Pressure from Last 3 Encounters:   06/19/17 114/73   05/19/17 108/62   05/10/17 112/64    Weight from Last 3 Encounters:   No data found for Wt              We Performed the Following     MEASURE POST-VOID RESIDUAL URINE/BLADDER CAPACITY, US NON-IMAGING     UA reflex to Microscopic and Culture     Urine Microscopic          Today's Medication Changes          These changes are accurate as of: 6/19/17 10:24 AM.  If you have any questions, ask your nurse or doctor.               Start taking these medicines.        Dose/Directions    mirabegron 25 MG 24 hr tablet   Commonly known as:  MYRBETRIQ   Used for:  Urinary frequency   Started by:  Hernán Mijares MD        Dose:  25 mg   Take 1 tablet (25 mg) by mouth daily   Quantity:  30 tablet   Refills:  1            Where to get your medicines      These medications were sent to [facility] - [address] [address]     Phone:  [phone]     mirabegron 25 MG 24 hr tablet                Primary Care Provider Office Phone #    [facility] [phone]       No address on file        Thank you!     Thank you for choosing [facility]  for your care. Our goal is always to provide you with excellent care. Hearing back from our patients is one way we can continue to improve our services. Please take a few minutes to complete the written survey that you may receive in the mail after your visit with us. Thank you!             Your Updated Medication List - Protect others around you: Learn how to safely use, store and throw away your medicines at www.disposemymeds.org.          This list is accurate as of: 6/19/17 10:24 AM.  Always use your most recent med list.                   Brand Name Dispense Instructions for  use    ascorbic acid 250 MG Chew chewable tablet    vitamin C     Take 500 mg by mouth daily       ATORVASTATIN CALCIUM PO      Patient does not know dose that she is taking       CLOPIDOGREL BISULFATE PO      Patient does not know current dose that she is taking       methenamine hippurate 1 G Tabs tablet    HIPREX    180 tablet    Take 1 tablet (1 g) by mouth 2 times daily Reported on 2/21/2017       mirabegron 25 MG 24 hr tablet    MYRBETRIQ    30 tablet    Take 1 tablet (25 mg) by mouth daily       Multi-vitamin Tabs tablet      Take 1 tablet by mouth daily       traMADol 50 MG tablet    ULTRAM    20 tablet    Take 1-2 tablets ( mg) by mouth every 6 hours as needed for moderate pain

## 2017-06-20 NOTE — TELEPHONE ENCOUNTER
Jessica with [employer] [facility] requesting Dr. Mijares to call in prior authorization for Myrbetriq. [phone] Insurance has denied paying for medication and patient would have to pay $400 out of pocket.    Please advise.    Thank you.    Radha KWOK

## 2017-06-21 NOTE — TELEPHONE ENCOUNTER
PA approved.  Effective date: 5/22/2017-6/21/2018  PA reference #: [id number]  Pt. notified:   Yes   LMOM that PA approved and patient to notify pharmacy.  Adriane Brennan RN